# Patient Record
Sex: FEMALE | Race: WHITE | NOT HISPANIC OR LATINO | URBAN - METROPOLITAN AREA
[De-identification: names, ages, dates, MRNs, and addresses within clinical notes are randomized per-mention and may not be internally consistent; named-entity substitution may affect disease eponyms.]

---

## 2020-10-27 ENCOUNTER — APPOINTMENT (RX ONLY)
Dept: URBAN - METROPOLITAN AREA CLINIC 82 | Facility: CLINIC | Age: 45
Setting detail: DERMATOLOGY
End: 2020-10-27

## 2020-10-27 DIAGNOSIS — L65.9 NONSCARRING HAIR LOSS, UNSPECIFIED: ICD-10-CM

## 2020-10-27 PROCEDURE — 99202 OFFICE O/P NEW SF 15 MIN: CPT

## 2020-10-27 PROCEDURE — ? COUNSELING

## 2020-10-27 PROCEDURE — ? ORDER TESTS

## 2020-10-27 PROCEDURE — ? PRESCRIPTION

## 2020-10-27 PROCEDURE — ? ADDITIONAL NOTES

## 2020-10-27 RX ORDER — CLOBETASOL PROPIONATE 0.5 MG/ML
SOLUTION TOPICAL
Qty: 1 | Refills: 2 | Status: ERX | COMMUNITY
Start: 2020-10-27

## 2020-10-27 RX ADMIN — CLOBETASOL PROPIONATE: 0.5 SOLUTION TOPICAL at 00:00

## 2020-10-27 ASSESSMENT — LOCATION ZONE DERM: LOCATION ZONE: SCALP

## 2020-10-27 ASSESSMENT — LOCATION SIMPLE DESCRIPTION DERM: LOCATION SIMPLE: LEFT SCALP

## 2020-10-27 ASSESSMENT — LOCATION DETAILED DESCRIPTION DERM: LOCATION DETAILED: LEFT MEDIAL FRONTAL SCALP

## 2020-10-27 NOTE — PROCEDURE: COUNSELING
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 Discussed that TE could be related to covid-related stress but that would recommend ruling out internal causes. Labs from prior PCP 7/2020 reviewed today including CBC, CMP, and TSH. \\n- Ordered labs including iron panel, ferritin, TSH and FT4, Vitamin D, zinc, ESR, JEFFERY, FSH, LH, prolactin, free and total testosterone. Patient reports she has hx of fibroids but CBC did not show anemia; no iron panel drawn. on could still be low.\\n-start clobetasol 0.5% solution every other morning x 2 weeks, stop 1 week, repeat cycle; e-rx sent. Discussed topical steroid SE including skin atrophy; should not allow to drip onto face\\n-minoxidil 5% men’s foam discussed. Massage into dry scalp. Avoid dripping or application to the face to prevent unwanted hair growth. Advised to discontinue minoxidil if scaling or irritation occur. Discussed SE such as swelling of the face, potential SE from systemic absorption, or irritation. Oral minoxidil discussed; defers at this time. Patient will wait to start topical minoxidil until her laboratory tests have resulted.\\n-intralesional kenalog discussed; could consider in the future
Detail Level: Detailed